# Patient Record
Sex: FEMALE | Race: ASIAN | Employment: FULL TIME | URBAN - METROPOLITAN AREA
[De-identification: names, ages, dates, MRNs, and addresses within clinical notes are randomized per-mention and may not be internally consistent; named-entity substitution may affect disease eponyms.]

---

## 2018-03-15 PROCEDURE — 88175 CYTOPATH C/V AUTO FLUID REDO: CPT | Performed by: FAMILY MEDICINE

## 2018-03-15 PROCEDURE — 87624 HPV HI-RISK TYP POOLED RSLT: CPT | Performed by: FAMILY MEDICINE

## 2018-03-19 PROCEDURE — 84480 ASSAY TRIIODOTHYRONINE (T3): CPT | Performed by: FAMILY MEDICINE

## 2025-05-30 ENCOUNTER — HOSPITAL ENCOUNTER (EMERGENCY)
Age: 55
Discharge: HOME | End: 2025-05-30
Attending: EMERGENCY MEDICINE
Payer: COMMERCIAL

## 2025-05-30 VITALS
HEART RATE: 83 BPM | OXYGEN SATURATION: 96 % | RESPIRATION RATE: 20 BRPM | TEMPERATURE: 99 F | HEIGHT: 65 IN | WEIGHT: 153 LBS | SYSTOLIC BLOOD PRESSURE: 130 MMHG | BODY MASS INDEX: 25.49 KG/M2 | DIASTOLIC BLOOD PRESSURE: 87 MMHG

## 2025-05-30 DIAGNOSIS — E87.6 HYPOKALEMIA: ICD-10-CM

## 2025-05-30 DIAGNOSIS — K29.00 ACUTE GASTRITIS WITHOUT HEMORRHAGE, UNSPECIFIED GASTRITIS TYPE: ICD-10-CM

## 2025-05-30 DIAGNOSIS — J11.1 INFLUENZA: Primary | ICD-10-CM

## 2025-05-30 LAB
ALBUMIN SERPL-MCNC: 4.9 G/DL (ref 3.2–4.8)
ALBUMIN/GLOB SERPL: 1.4 {RATIO} (ref 1–2)
ALP LIVER SERPL-CCNC: 103 U/L (ref 46–118)
ALT SERPL-CCNC: 32 U/L (ref 10–49)
ANION GAP SERPL CALC-SCNC: 8 MMOL/L (ref 0–18)
AST SERPL-CCNC: 26 U/L (ref ?–34)
BASOPHILS # BLD AUTO: 0.01 X10(3) UL (ref 0–0.2)
BASOPHILS NFR BLD AUTO: 0.2 %
BILIRUB SERPL-MCNC: 0.7 MG/DL (ref 0.3–1.2)
BILIRUB UR QL STRIP.AUTO: NEGATIVE
BUN BLD-MCNC: 9 MG/DL (ref 9–23)
CALCIUM BLD-MCNC: 9 MG/DL (ref 8.7–10.6)
CHLORIDE SERPL-SCNC: 98 MMOL/L (ref 98–112)
CLARITY UR REFRACT.AUTO: CLEAR
CO2 SERPL-SCNC: 27 MMOL/L (ref 21–32)
COLOR UR AUTO: YELLOW
CREAT BLD-MCNC: 0.78 MG/DL (ref 0.55–1.02)
EGFRCR SERPLBLD CKD-EPI 2021: 89 ML/MIN/1.73M2 (ref 60–?)
EOSINOPHIL # BLD AUTO: 0.01 X10(3) UL (ref 0–0.7)
EOSINOPHIL NFR BLD AUTO: 0.2 %
ERYTHROCYTE [DISTWIDTH] IN BLOOD BY AUTOMATED COUNT: 11.9 %
GLOBULIN PLAS-MCNC: 3.5 G/DL (ref 2–3.5)
GLUCOSE BLD-MCNC: 125 MG/DL (ref 70–99)
GLUCOSE UR STRIP.AUTO-MCNC: NEGATIVE MG/DL
HCT VFR BLD AUTO: 40.2 % (ref 35–48)
HGB BLD-MCNC: 14 G/DL (ref 12–16)
IMM GRANULOCYTES # BLD AUTO: 0.03 X10(3) UL (ref 0–1)
IMM GRANULOCYTES NFR BLD: 0.6 %
KETONES UR STRIP.AUTO-MCNC: NEGATIVE MG/DL
LEUKOCYTE ESTERASE UR QL STRIP.AUTO: NEGATIVE
LIPASE SERPL-CCNC: 50 U/L (ref 12–53)
LYMPHOCYTES # BLD AUTO: 1.27 X10(3) UL (ref 1–4)
LYMPHOCYTES NFR BLD AUTO: 23.3 %
MCH RBC QN AUTO: 31.4 PG (ref 26–34)
MCHC RBC AUTO-ENTMCNC: 34.8 G/DL (ref 31–37)
MCV RBC AUTO: 90.1 FL (ref 80–100)
MONOCYTES # BLD AUTO: 0.44 X10(3) UL (ref 0.1–1)
MONOCYTES NFR BLD AUTO: 8.1 %
NEUTROPHILS # BLD AUTO: 3.68 X10 (3) UL (ref 1.5–7.7)
NEUTROPHILS # BLD AUTO: 3.68 X10(3) UL (ref 1.5–7.7)
NEUTROPHILS NFR BLD AUTO: 67.6 %
NITRITE UR QL STRIP.AUTO: NEGATIVE
OSMOLALITY SERPL CALC.SUM OF ELEC: 276 MOSM/KG (ref 275–295)
PH UR STRIP.AUTO: 7 [PH] (ref 5–8)
PLATELET # BLD AUTO: 272 10(3)UL (ref 150–450)
POCT INFLUENZA A: POSITIVE
POCT INFLUENZA B: NEGATIVE
POTASSIUM SERPL-SCNC: 3.2 MMOL/L (ref 3.5–5.1)
PROT SERPL-MCNC: 8.4 G/DL (ref 5.7–8.2)
RBC # BLD AUTO: 4.46 X10(6)UL (ref 3.8–5.3)
RBC UR QL AUTO: NEGATIVE
SARS-COV-2 RNA RESP QL NAA+PROBE: NOT DETECTED
SODIUM SERPL-SCNC: 133 MMOL/L (ref 136–145)
SP GR UR STRIP.AUTO: 1.01 (ref 1–1.03)
UROBILINOGEN UR STRIP.AUTO-MCNC: 0.2 MG/DL
WBC # BLD AUTO: 5.4 X10(3) UL (ref 4–11)

## 2025-05-30 PROCEDURE — 81001 URINALYSIS AUTO W/SCOPE: CPT | Performed by: EMERGENCY MEDICINE

## 2025-05-30 PROCEDURE — 87502 INFLUENZA DNA AMP PROBE: CPT | Performed by: EMERGENCY MEDICINE

## 2025-05-30 PROCEDURE — 99284 EMERGENCY DEPT VISIT MOD MDM: CPT

## 2025-05-30 PROCEDURE — 96375 TX/PRO/DX INJ NEW DRUG ADDON: CPT

## 2025-05-30 PROCEDURE — 80053 COMPREHEN METABOLIC PANEL: CPT | Performed by: EMERGENCY MEDICINE

## 2025-05-30 PROCEDURE — 87430 STREP A AG IA: CPT | Performed by: EMERGENCY MEDICINE

## 2025-05-30 PROCEDURE — 96374 THER/PROPH/DIAG INJ IV PUSH: CPT

## 2025-05-30 PROCEDURE — 83690 ASSAY OF LIPASE: CPT | Performed by: EMERGENCY MEDICINE

## 2025-05-30 PROCEDURE — 96361 HYDRATE IV INFUSION ADD-ON: CPT

## 2025-05-30 PROCEDURE — 85025 COMPLETE CBC W/AUTO DIFF WBC: CPT | Performed by: EMERGENCY MEDICINE

## 2025-05-30 PROCEDURE — 81015 MICROSCOPIC EXAM OF URINE: CPT | Performed by: EMERGENCY MEDICINE

## 2025-05-30 RX ORDER — ONDANSETRON 4 MG/1
4 TABLET, ORALLY DISINTEGRATING ORAL EVERY 4 HOURS PRN
Qty: 10 TABLET | Refills: 0 | Status: SHIPPED | OUTPATIENT
Start: 2025-05-30 | End: 2025-06-06

## 2025-05-30 RX ORDER — KETOROLAC TROMETHAMINE 15 MG/ML
15 INJECTION, SOLUTION INTRAMUSCULAR; INTRAVENOUS ONCE
Status: COMPLETED | OUTPATIENT
Start: 2025-05-30 | End: 2025-05-30

## 2025-05-30 RX ORDER — POTASSIUM CHLORIDE 1500 MG/1
40 TABLET, EXTENDED RELEASE ORAL ONCE
Status: COMPLETED | OUTPATIENT
Start: 2025-05-30 | End: 2025-05-30

## 2025-05-30 RX ORDER — ONDANSETRON 2 MG/ML
4 INJECTION INTRAMUSCULAR; INTRAVENOUS ONCE
Status: COMPLETED | OUTPATIENT
Start: 2025-05-30 | End: 2025-05-30

## 2025-05-30 RX ORDER — PANTOPRAZOLE SODIUM 40 MG/1
40 TABLET, DELAYED RELEASE ORAL DAILY
Qty: 30 TABLET | Refills: 0 | Status: SHIPPED | OUTPATIENT
Start: 2025-05-30 | End: 2025-06-29

## 2025-05-30 NOTE — ED PROVIDER NOTES
Patient Seen in: ward Emergency Department In [place]        History  Chief Complaint   Patient presents with    Abdomen/Flank Pain    Sore Throat     Stated Complaint: sore throat and abdomen flank pain after returning from a cruise . hx pan*    Subjective:   HPI            55-year-old female comes to the hospital complaint having difficulty with a sore throat over the last few days as well as some epigastric pain that is worse at night over the last few days.  She just came back on  from cruise to [place].  She has had nausea and occasional cough but denies any vomiting or diarrhea.  She is a urinary symptoms.  She has any headaches.  She denies any diarrhea.  She has no other specific complaints at this time.      Objective:     Past Medical History:    ACL (anterior cruciate ligament) tear    Hypothyroid              Past Surgical History:   Procedure Laterality Date    Cholecystectomy            x2    Other surgical history Left     acl reconstruction      Other surgical history Left 2019    left knee                Social History     Socioeconomic History    Marital status:    Tobacco Use    Smoking status: Never    Smokeless tobacco: Never    Tobacco comments:     Job:  owns [employer]   Vaping Use    Vaping status: Never Used   Substance and Sexual Activity    Alcohol use: No    Drug use: Never                                Physical Exam    ED Triage Vitals [25 0645]   BP (!) 160/94   Pulse 90   Resp 20   Temp (!) 102.7 °F (39.3 °C)   Temp src Oral   SpO2 100 %   O2 Device None (Room air)       Current Vitals:   Vital Signs  BP: 130/87  Pulse: 83  Resp: 20  Temp: 99.4 °F (37.4 °C)  Temp src: Oral    Oxygen Therapy  SpO2: 96 %  O2 Device: None (Room air)            Physical Exam  HEENT : NCAT, EOMI, PEERL,  neck supple, no JVD, trachea midline, No LAD  Heart: S1S2 normal. No murmurs, regular rate and rhythm  Lungs: Clear to auscultation bilaterally  Abdomen: Soft  nontender nondistended normal active bowel sounds without rebound, guarding or masses noted  Back nontender without CVA tenderness  Extremity no clubbing, cyanosis or edema noted.  Full range of motion noted without tenderness  Neuro: No focal deficits noted    All measures to prevent infection transmission during my interaction with the patient were taken.  The patient was already wearing droplet mask on my arrival to the room.  Personal protective equipment including a droplet mask as well as gloves were worn throughout the duration of my exam.  Hand washing was performed prior to and after the exam.  Stethoscope and equipment used during my examination was cleaned with a super Sani cloth germicidal wipe following the exam.        ED Course  Labs Reviewed   COMP METABOLIC PANEL (14) - Abnormal; Notable for the following components:       Result Value    Glucose 125 (*)     Sodium 133 (*)     Potassium 3.2 (*)     Total Protein 8.4 (*)     Albumin 4.9 (*)     All other components within normal limits   URINALYSIS, ROUTINE - Abnormal; Notable for the following components:    Protein Urine 30 mg/dL (*)     All other components within normal limits   UA MICROSCOPIC ONLY, URINE - Abnormal; Notable for the following components:    Bacteria Urine Rare (*)     Squamous Epi. Cells Few (*)     All other components within normal limits   POCT FLU TEST - Abnormal; Notable for the following components:    POCT INFLUENZA A Positive (*)     All other components within normal limits    Narrative:     This assay is a rapid molecular in vitro test utilizing nucleic acid amplification of influenza A and B viral RNA.   LIPASE - Normal   RAPID STREP A SCREEN (LC) - Normal    Narrative:     A confirmatory culture is recommended if clinically indicated.   RAPID SARS-COV-2 BY PCR - Normal   CBC WITH DIFFERENTIAL WITH PLATELET   SCAN SLIDE       ED Course as of 05/30/25  0809  ------------------------------------------------------------  Time: 05/30 0807  Comment: While here the patient's urinalysis was negative.  She is positive for influenza A.  Her COVID test was negative.  CBC was normal.  The sodium was 133 the potassium was 3.2.  The potassium was replaced.  Her lipase was 50.  She is given IV fluid as well as Toradol and Zofran with improvement.     Medications   ondansetron (Zofran) 4 MG/2ML injection 4 mg (4 mg Intravenous Given 5/30/25 0658)   ketorolac (Toradol) 15 MG/ML injection 15 mg (15 mg Intravenous Given 5/30/25 0658)   sodium chloride 0.9 % IV bolus 1,000 mL (0 mL Intravenous Stopped 5/30/25 2217)   potassium chloride (Klor-Con M20) tab 40 mEq (40 mEq Oral Given 5/30/25 0743)                       MDM     Differential diagnosis included COVID, influenza, gastritis, ulcer disease, gastroenteritis, pancreatitis but not limited to such.  Patient symptoms are consistent with influenza and gastritis.  She is improved with the above we discharged home with medical management follow-up.    Patient was screened and evaluated during this visit.   As a treating physician attending to the patient, I determined, within reasonable clinical confidence and prior to discharge, that an emergency medical condition was not or was no longer present.  There was no indication for further evaluation, treatment or admission on an emergency basis.       The usual and customary discharge instuctions were discussed given the patient's ER course.  We discussed signs and symptoms that should prompt the patient's immediate return to the emergency department.   Reasonable over the counter and prescription treatment options and Physician follow up plan was discussed.       The patient is discharged in good condition.     This note was prepared using Dragon Medical voice recognition dictation software.  As a result errors may occur.  When identified to these areas have been corrected.  While  every attempt is made to correct errors during dictation discrepancies may still exist.  Please contact if there are any errors.        Medical Decision Making      Disposition and Plan     Clinical Impression:  1. Influenza    2. Acute gastritis without hemorrhage, unspecified gastritis type    3. Hypokalemia         Disposition:  Discharge  5/30/2025  8:08 am    Follow-up:  Jonatan Ko DO  [address] [facility] [address]  [phone]    Schedule an appointment as soon as possible for a visit in 3 day(s)            Medications Prescribed:  Current Discharge Medication List        START taking these medications    Details   ondansetron 4 MG Oral Tablet Dispersible Take 1 tablet (4 mg total) by mouth every 4 (four) hours as needed for Nausea.  Qty: 10 tablet, Refills: 0      pantoprazole 40 MG Oral Tab EC Take 1 tablet (40 mg total) by mouth daily.  Qty: 30 tablet, Refills: 0                   Supplementary Documentation:

## 2025-06-01 ENCOUNTER — HOSPITAL ENCOUNTER (EMERGENCY)
Age: 55
Discharge: HOME | End: 2025-06-01
Attending: EMERGENCY MEDICINE
Payer: COMMERCIAL

## 2025-06-01 ENCOUNTER — APPOINTMENT (OUTPATIENT)
Dept: CT IMAGING | Age: 55
End: 2025-06-01
Attending: EMERGENCY MEDICINE
Payer: COMMERCIAL

## 2025-06-01 ENCOUNTER — APPOINTMENT (OUTPATIENT)
Dept: GENERAL RADIOLOGY | Age: 55
End: 2025-06-01
Attending: EMERGENCY MEDICINE
Payer: COMMERCIAL

## 2025-06-01 VITALS
HEIGHT: 66 IN | SYSTOLIC BLOOD PRESSURE: 155 MMHG | TEMPERATURE: 98 F | OXYGEN SATURATION: 96 % | RESPIRATION RATE: 18 BRPM | BODY MASS INDEX: 23.3 KG/M2 | DIASTOLIC BLOOD PRESSURE: 88 MMHG | HEART RATE: 74 BPM | WEIGHT: 145 LBS

## 2025-06-01 DIAGNOSIS — K21.9 GASTROESOPHAGEAL REFLUX DISEASE, UNSPECIFIED WHETHER ESOPHAGITIS PRESENT: ICD-10-CM

## 2025-06-01 DIAGNOSIS — J18.9 COMMUNITY ACQUIRED PNEUMONIA, UNSPECIFIED LATERALITY: Primary | ICD-10-CM

## 2025-06-01 LAB
ALBUMIN SERPL-MCNC: 4.4 G/DL (ref 3.2–4.8)
ALBUMIN/GLOB SERPL: 1.3 {RATIO} (ref 1–2)
ALP LIVER SERPL-CCNC: 102 U/L (ref 46–118)
ALT SERPL-CCNC: 36 U/L (ref 10–49)
ANION GAP SERPL CALC-SCNC: 8 MMOL/L (ref 0–18)
AST SERPL-CCNC: 35 U/L (ref ?–34)
BASOPHILS # BLD AUTO: 0.01 X10(3) UL (ref 0–0.2)
BASOPHILS NFR BLD AUTO: 0.1 %
BILIRUB SERPL-MCNC: 0.7 MG/DL (ref 0.3–1.2)
BUN BLD-MCNC: 7 MG/DL (ref 9–23)
CALCIUM BLD-MCNC: 8.6 MG/DL (ref 8.7–10.6)
CHLORIDE SERPL-SCNC: 103 MMOL/L (ref 98–112)
CO2 SERPL-SCNC: 26 MMOL/L (ref 21–32)
CREAT BLD-MCNC: 0.64 MG/DL (ref 0.55–1.02)
D DIMER PPP FEU-MCNC: 0.58 UG/ML FEU (ref ?–0.57)
EGFRCR SERPLBLD CKD-EPI 2021: 103 ML/MIN/1.73M2 (ref 60–?)
EOSINOPHIL # BLD AUTO: 0.05 X10(3) UL (ref 0–0.7)
EOSINOPHIL NFR BLD AUTO: 0.7 %
ERYTHROCYTE [DISTWIDTH] IN BLOOD BY AUTOMATED COUNT: 11.9 %
GLOBULIN PLAS-MCNC: 3.5 G/DL (ref 2–3.5)
GLUCOSE BLD-MCNC: 118 MG/DL (ref 70–99)
HCT VFR BLD AUTO: 38.5 % (ref 35–48)
HGB BLD-MCNC: 13.2 G/DL (ref 12–16)
IMM GRANULOCYTES # BLD AUTO: 0.01 X10(3) UL (ref 0–1)
IMM GRANULOCYTES NFR BLD: 0.1 %
LIPASE SERPL-CCNC: 62 U/L (ref 12–53)
LYMPHOCYTES # BLD AUTO: 1.78 X10(3) UL (ref 1–4)
LYMPHOCYTES NFR BLD AUTO: 23.3 %
MCH RBC QN AUTO: 31 PG (ref 26–34)
MCHC RBC AUTO-ENTMCNC: 34.3 G/DL (ref 31–37)
MCV RBC AUTO: 90.4 FL (ref 80–100)
MONOCYTES # BLD AUTO: 0.33 X10(3) UL (ref 0.1–1)
MONOCYTES NFR BLD AUTO: 4.3 %
NEUTROPHILS # BLD AUTO: 5.46 X10 (3) UL (ref 1.5–7.7)
NEUTROPHILS # BLD AUTO: 5.46 X10(3) UL (ref 1.5–7.7)
NEUTROPHILS NFR BLD AUTO: 71.5 %
OSMOLALITY SERPL CALC.SUM OF ELEC: 283 MOSM/KG (ref 275–295)
PLATELET # BLD AUTO: 251 10(3)UL (ref 150–450)
POTASSIUM SERPL-SCNC: 3.5 MMOL/L (ref 3.5–5.1)
PROT SERPL-MCNC: 7.9 G/DL (ref 5.7–8.2)
Q-T INTERVAL: 418 MS
QRS DURATION: 70 MS
QTC CALCULATION (BEZET): 454 MS
R AXIS: -4 DEGREES
RBC # BLD AUTO: 4.26 X10(6)UL (ref 3.8–5.3)
SODIUM SERPL-SCNC: 137 MMOL/L (ref 136–145)
T AXIS: 25 DEGREES
TROPONIN I SERPL HS-MCNC: 3 NG/L (ref ?–34)
VENTRICULAR RATE: 71 BPM
WBC # BLD AUTO: 7.6 X10(3) UL (ref 4–11)

## 2025-06-01 PROCEDURE — 84484 ASSAY OF TROPONIN QUANT: CPT | Performed by: EMERGENCY MEDICINE

## 2025-06-01 PROCEDURE — 83690 ASSAY OF LIPASE: CPT | Performed by: EMERGENCY MEDICINE

## 2025-06-01 PROCEDURE — 96375 TX/PRO/DX INJ NEW DRUG ADDON: CPT

## 2025-06-01 PROCEDURE — 87040 BLOOD CULTURE FOR BACTERIA: CPT | Performed by: EMERGENCY MEDICINE

## 2025-06-01 PROCEDURE — 71045 X-RAY EXAM CHEST 1 VIEW: CPT | Performed by: EMERGENCY MEDICINE

## 2025-06-01 PROCEDURE — 85379 FIBRIN DEGRADATION QUANT: CPT | Performed by: EMERGENCY MEDICINE

## 2025-06-01 PROCEDURE — 93005 ELECTROCARDIOGRAM TRACING: CPT

## 2025-06-01 PROCEDURE — 96365 THER/PROPH/DIAG IV INF INIT: CPT

## 2025-06-01 PROCEDURE — 85025 COMPLETE CBC W/AUTO DIFF WBC: CPT | Performed by: EMERGENCY MEDICINE

## 2025-06-01 PROCEDURE — 93010 ELECTROCARDIOGRAM REPORT: CPT

## 2025-06-01 PROCEDURE — 71275 CT ANGIOGRAPHY CHEST: CPT | Performed by: EMERGENCY MEDICINE

## 2025-06-01 PROCEDURE — 80053 COMPREHEN METABOLIC PANEL: CPT | Performed by: EMERGENCY MEDICINE

## 2025-06-01 PROCEDURE — 36415 COLL VENOUS BLD VENIPUNCTURE: CPT

## 2025-06-01 PROCEDURE — 99285 EMERGENCY DEPT VISIT HI MDM: CPT

## 2025-06-01 PROCEDURE — 96361 HYDRATE IV INFUSION ADD-ON: CPT

## 2025-06-01 RX ORDER — MAGNESIUM HYDROXIDE/ALUMINUM HYDROXICE/SIMETHICONE 120; 1200; 1200 MG/30ML; MG/30ML; MG/30ML
30 SUSPENSION ORAL ONCE
Status: COMPLETED | OUTPATIENT
Start: 2025-06-01 | End: 2025-06-01

## 2025-06-01 RX ORDER — BENZONATATE 100 MG/1
100 CAPSULE ORAL 3 TIMES DAILY PRN
Qty: 30 CAPSULE | Refills: 0 | Status: SHIPPED | OUTPATIENT
Start: 2025-06-01 | End: 2025-07-01

## 2025-06-01 RX ORDER — FAMOTIDINE 10 MG/ML
20 INJECTION, SOLUTION INTRAVENOUS ONCE
Status: COMPLETED | OUTPATIENT
Start: 2025-06-01 | End: 2025-06-01

## 2025-06-01 RX ORDER — AZITHROMYCIN 250 MG/1
TABLET, FILM COATED ORAL
Qty: 6 TABLET | Refills: 0 | Status: SHIPPED | OUTPATIENT
Start: 2025-06-01 | End: 2025-06-06

## 2025-06-01 RX ORDER — OMEPRAZOLE 40 MG/1
40 CAPSULE, DELAYED RELEASE ORAL DAILY
Qty: 30 CAPSULE | Refills: 0 | Status: SHIPPED | OUTPATIENT
Start: 2025-06-01 | End: 2025-07-01

## 2025-06-01 RX ORDER — KETOROLAC TROMETHAMINE 15 MG/ML
15 INJECTION, SOLUTION INTRAMUSCULAR; INTRAVENOUS ONCE
Status: COMPLETED | OUTPATIENT
Start: 2025-06-01 | End: 2025-06-01

## 2025-06-01 RX ORDER — HYDROCODONE BITARTRATE AND HOMATROPINE METHYLBROMIDE ORAL SOLUTION 5; 1.5 MG/5ML; MG/5ML
5 LIQUID ORAL NIGHTLY
Qty: 120 ML | Refills: 0 | Status: SHIPPED | OUTPATIENT
Start: 2025-06-01

## 2025-06-01 RX ORDER — ONDANSETRON 2 MG/ML
4 INJECTION INTRAMUSCULAR; INTRAVENOUS ONCE
Status: COMPLETED | OUTPATIENT
Start: 2025-06-01 | End: 2025-06-01

## 2025-06-01 NOTE — ED QUICK NOTES
Brought patient to the bathroom. Once she was finished, I brought her back to the room and made sure she was comfortable.

## 2025-06-01 NOTE — ED INITIAL ASSESSMENT (HPI)
Complaining of sorethroat since yesterday states difficult to take a deep breath,  . Seen in ED few days ago , + flu  A  with gastritis . + non stop cough at night .

## 2025-06-01 NOTE — ED PROVIDER NOTES
Patient Seen in: ward Emergency Department In [place]        History  Chief Complaint   Patient presents with    Shortness Of Breath     Stated Complaint: shortness of breath    Subjective:   HPI  Patient states she speaks English, she also speaks Chinese.   at bedside is present and gives history as well, occasionally translates to patient in Chinese.  Offered , they both declined.  55 yr old F pmh hyperlipidemia, cholecystectomy, GERD previously on omeprazole presents to ED with report of recent visit Friday for similar symptoms including cough, sore throat, epigastric discomfort, stuffy nose causing shortness of breath at which time she was diagnosed with flu a and gastritis.  She states symptoms started last Sunday, a week ago after a cruise.   states that other family members had similar symptoms.  They deny fevers, chills, vomiting, diarrhea, urinary symptoms, chest pain, leg edema.  She states that she has been staying up all night due to her cough.  She states that she tried NyQuil but did not take it last night.  She denies history of gastritis.  Per record she had a history of GERD in 2022 and was placed on omeprazole but states she has not been taking it.  Denies history of seeing GI.   states that their daughter is a PT and given her epigastric discomfort and nausea wanted her to come to the ER get her heart checked.      Objective:     No pertinent past medical history.            No pertinent past surgical history.              No pertinent social history.                              Physical Exam    ED Triage Vitals   BP 06/01/25 0622 (!) 152/96   Pulse 06/01/25 0622 72   Resp 06/01/25 0622 18   Temp 06/01/25 0622 98.1 °F (36.7 °C)   Temp src 06/01/25 0622 Oral   SpO2 06/01/25 0622 97 %   O2 Device 06/01/25 0832 None (Room air)       Current Vitals:   Vital Signs  BP: 155/88  Pulse: 74  Resp: 18  Temp: 98.1 °F (36.7 °C)  Temp src: Oral    Oxygen Therapy  SpO2: 96  %  O2 Device: None (Room air)            Physical Exam  Vital signs reviewed.  Nursing note reviewed.  Constitutional: Alert, no significant distress  Head: Normocephalic, atraumatic  Mouth: Moist  Eyes: Extraocular muscles intact, pupils equal  Cardiovascular: Regular rate and rhythm  Pulmonary: Effort normal, breath sounds normal, 97% room air, speaks in full sentences, no wheezing no crackles  Abdomen: Soft, mild epigastric tenderness, nondistended  Skin: Warm and dry  Musculoskeletal range of motion grossly normal all extremities  Neuro: Alert, at baseline, no focal neuro deficit.  Moves all extremities against gravity  Psych: Mood normal              ED Course  Labs Reviewed   COMP METABOLIC PANEL (14) - Abnormal; Notable for the following components:       Result Value    Glucose 118 (*)     BUN 7 (*)     Calcium, Total 8.6 (*)     AST 35 (*)     All other components within normal limits   LIPASE - Abnormal; Notable for the following components:    Lipase 62 (*)     All other components within normal limits   D-DIMER - Abnormal; Notable for the following components:    D-Dimer 0.58 (*)     All other components within normal limits   TROPONIN I HIGH SENSITIVITY - Normal   CBC WITH DIFFERENTIAL WITH PLATELET   SCAN SLIDE   BLOOD CULTURE   BLOOD CULTURE     EKG    Rate, intervals and axes as noted on EKG Report.  Rate: 71  Rhythm: Sinus Rhythm  Reading: No new contiguous ST-T wave abnormality                  CTA CHEST (CPT=71275)  Result Date: 6/1/2025  PROCEDURE:  CT ANGIOGRAPHY, CHEST (CPT=71275)  COMPARISON:  [facility], XR, XR CHEST AP PORTABLE  (CPT=71045), 6/01/2025, 7:13 AM.  INDICATIONS:  shortness of breath, elev d dimer  TECHNIQUE:  IV contrast-enhanced multislice CT angiography is performed through the pulmonary arterial anatomy. 3D volume renderings are generated.  Dose reduction techniques were used. Dose information is transmitted to the ACR (American College of Radiology) NRDR (National Radiology  Data Registry) which includes the Dose Index Registry.  PATIENT STATED HISTORY:(As transcribed by Technologist)  The patient states she has had shortness of breath and productive cough x 1 week.   CONTRAST USED:  100cc of Isovue 370  FINDINGS:  VASCULATURE:  No visible pulmonary arterial thrombus or attenuation.  THORACIC AORTA:  No aneurysm or visible dissection.  LUNGS:  Patchy airspace consolidation noted in the posterior lateral right upper lobe, superior segment of the right lower lobe, and posterior basilar segment and medial segment of the right lower lobe.  There is small patchy areas of consolidation within the left upper lobe posteriorly and laterally as well as the posterior medial basilar segment of the left lower lobe.  Findings most likely related to atypical pneumonia.  There is also bandlike opacity within the right middle lobe and lingula likely representing atelectasis. MEDIASTINUM:  Low right paratracheal lymph node measures 10 x 9 mm and high right paratracheal lymph node measures 9 x 9 mm. MONICA:  Right hilar adenopathy measuring 15 x 14 mm and infrahilar adenopathy measuring 18 x 10 mm.  Prominent left infrahilar lymph node measures 13 x 12 mm. CARDIAC:  No enlargement, pericardial thickening, or significant coronary artery calcification. PLEURA:  No mass or effusion.  CHEST WALL:  No mass or axillary adenopathy.  LIMITED ABDOMEN:  Limited images of the upper abdomen are unremarkable.  BONES:  No bony lesion or fracture.  OTHER:  Negative.             CONCLUSION:   1. Multifocal small areas of patchy airspace consolidation within the bilateral lungs concerning for infectious or inflammatory process such as atypical pneumonia.  2. Mild hilar lymphadenopathy likely due to the infectious or inflammatory process.  3. No evidence of pulmonary embolus.    LOCATION:  [facility]   Dictated by (CST): Miguel Winn MD on 6/01/2025 at 8:20 AM     Finalized by (CST): Miguel Winn MD on 6/01/2025 at 8:25 AM        XR CHEST AP PORTABLE  (CPT=71045)  Result Date: 6/1/2025  PROCEDURE:  XR CHEST AP PORTABLE  (CPT=71045)  TECHNIQUE:  AP chest radiograph was obtained.  COMPARISON:  None.  INDICATIONS:  shortness of breath  PATIENT STATED HISTORY: (As transcribed by Technologist)  Patient states, \"it feels harder to breath\" and she points to her upper chest and neck area.    FINDINGS:  The heart is normal in size.  The lungs are clear.  There are no pleural effusions.  The bones are unremarkable.            CONCLUSION:  No acute disease.    LOCATION:  [facility]      Dictated by (CST): Miguel Winn MD on 6/01/2025 at 7:36 AM     Finalized by (CST): Miguel Winn MD on 6/01/2025 at 7:36 AM                         MDM     Medical Decision Making:    Differential diagnosis before testing includes pneumonia, influenza related symptoms, sinusitis, myocarditis, PE, potential life threatening diagnosis which is a medical condition that poses a threat to life/function.     Comorbidities that add complexity to management: See HPI    I reviewed prior external ED notes including reviewed office visit from 2022 patient diagnosed with GERD and started on omeprazole      Social determinants of health care:  at bedside    I personally reviewed the radiographs and my independent interpretation includes no lung consolidations, no pneumothorax.     Shared decision making: Just    Afebrile, no leukocytosis, chest x-ray negative.  Troponin negative, EKG nonischemic.  D-dimer slightly elevated, CTA chest no PE, multifocal small areas of patchy airspace opacity likely pneumonia.  Given IV Rocephin, blood cultures drawn.  Patient up walking around, feels weak but wants to go home.  Offered admission to  and patient, however they declined and would like to DC home with antibiotics.  Given prescription for Augmentin, Z-[place], Tessalon Perles and Hycodan cough syrup.  Discussed with her history of GERD and was started on omeprazole 3 years ago,  but patient stopped taking it, given prescription for omeprazole 40 mg daily.  Lipase very minimally elevated, however not 3 times upper limit of normal so not clinically significant.  Upper abdomen viewed on CT no acute process.  History of cholecystectomy.  Likely gastritis/GERD, given Pepcid, Maalox in ED.  Feeling better now.  Discussed with her that she needs to call GI for follow-up and have an EGD scope done given her age and ongoing GERD symptoms.  Patient will return for worsening, or is follow-up with PCP within 2 days to ensure she is feeling better.  Also discussed to call GI and have close follow-up.  Initially possible junctional rhythm on EKG and she was reporting palpitations, likely related to pneumonia, discussed with her to call cardiology for follow-up in a week if palpitations symptomatology does not improve with treatment of pneumonia. Spent at least 35 min at bedside with pt and , gathering info and giving results, plan.     Medical Decision Making      Disposition and Plan     Clinical Impression:  1. Community acquired pneumonia, unspecified laterality    2. Gastroesophageal reflux disease, unspecified whether esophagitis present         Disposition:  Discharge  6/1/2025  9:09 am    Follow-up:  Fred Maki MD  [address] [place] [address]  [facility] [id number] [phone]    Follow up in 1 day(s)  establish doctor    Bryan Barreto MD  [address] [facility] [address] [phone]    Follow up in 1 day(s)      [facility]  [address]  [facility] [id number] [phone]  Follow up in 1 week(s)      [facility]  [address]  [address]  [facility] [address]  [phone]              Medications Prescribed:  Discharge Medication List as of 6/1/2025 10:14 AM        START taking these medications    Details   amoxicillin clavulanate 875-125 MG Oral Tab Take 1 tablet by mouth 2 (two) times daily for 10 days., Normal, Disp-20 tablet, R-0       azithromycin (ZITHROMAX Z-VERONICA) 250 MG Oral Tab 500 mg once followed by 250 mg daily x 4 days, Normal, Disp-6 tablet, R-0      benzonatate 100 MG Oral Cap Take 1 capsule (100 mg total) by mouth 3 (three) times daily as needed for cough., Normal, Disp-30 capsule, R-0      HYDROcodone-homatropine (HYCODAN) 5-1.5 MG/5ML Oral Solution Take 5 mL by mouth at bedtime., Normal, Disp-120 mL, R-0      Omeprazole 40 MG Oral Capsule Delayed Release Take 1 capsule (40 mg total) by mouth daily., Normal, Disp-30 capsule, R-0                   Supplementary Documentation:

## 2025-06-03 ENCOUNTER — OFFICE VISIT (OUTPATIENT)
Dept: FAMILY MEDICINE CLINIC | Facility: CLINIC | Age: 55
End: 2025-06-03
Payer: COMMERCIAL

## 2025-06-03 VITALS
RESPIRATION RATE: 18 BRPM | WEIGHT: 144 LBS | TEMPERATURE: 97 F | DIASTOLIC BLOOD PRESSURE: 74 MMHG | HEART RATE: 84 BPM | HEIGHT: 66 IN | BODY MASS INDEX: 23.14 KG/M2 | SYSTOLIC BLOOD PRESSURE: 120 MMHG

## 2025-06-03 DIAGNOSIS — J18.9 PNEUMONIA OF BOTH LOWER LOBES DUE TO INFECTIOUS ORGANISM: Primary | ICD-10-CM

## 2025-06-03 PROCEDURE — 3008F BODY MASS INDEX DOCD: CPT | Performed by: FAMILY MEDICINE

## 2025-06-03 PROCEDURE — 3078F DIAST BP <80 MM HG: CPT | Performed by: FAMILY MEDICINE

## 2025-06-03 PROCEDURE — 3074F SYST BP LT 130 MM HG: CPT | Performed by: FAMILY MEDICINE

## 2025-06-03 PROCEDURE — 99203 OFFICE O/P NEW LOW 30 MIN: CPT | Performed by: FAMILY MEDICINE

## 2025-06-03 NOTE — PROGRESS NOTES
[facility] Family Medicine Office Note  Chief Complaint:   Chief Complaint   Patient presents with    ER F/U       HPI:   This is a 55 year old female coming in for follow-up after being in the emergency room.  The patient was diagnosed with pneumonia.  She states that she has had improvement with the medications that she was given.  States that her shortness of breath has been improving she is still having some episodes of cough.  She denies any other acute concerns today.      Past Medical History[1]  Past Surgical History[2]  Social History:  Short Social Hx on File[3]  Family History:  Family History[4]  Allergies:  Allergies[5]  Current Meds:  Current Medications[6]   Counseling given: Not Answered  Tobacco comments: Job:  owns restaurant       REVIEW OF SYSTEMS:   Consitutional: No fevers, chills, sweats  Eye: No recent visual problems  ENMT: No ear pain nasal congestion sore throat  Respiratory: No shortness of breath, cough  Cardiovascular: No chest pain palpitations syncope  Gastrointestinal: No nausea vomiting diarrhea  Genitourinary: No hematuria  Hema/Lymph no bruising tendency, swollen lymph glands      Medical, surgical, family, and social histories were reviewed      EXAM:     VITALS:   Vitals:    06/03/25 1359   BP: 120/74   Pulse: 84   Resp: 18   Temp: 97.1 °F (36.2 °C)      GENERAL: well developed, well nourished, in no apparent distress  SKIN: no rashes, no suspicious lesions: Cool and Dry  HEENT: atraumatic, normocephalic, ears and throat are clear    Ears: TM's clear and visible bilaterally, no excess cerumen or erythema.   EYES: Pupils equal round and reactive.  Extraocular motions intact no scleral icterus no injection or drainage  THROAT without erythema tonsillar hypertrophy or exudate.  Uvula midline airway patent  NECK: Given midline.  No JVD or lymphadenopathy supple nontender no meningeal signs   LUNGS: clear to auscultation sounds equal bilaterally no wheezes rales or  rhonchi  CARDIO: Regular rate and rhythm without murmurs gallops or rubs    ASSESSMENT AND PLAN:   1. Pneumonia of both lower lobes due to infectious organism  I did discuss with the patient to continue with the medications that she was given in the emergency room.  She was asked to watch for any episodes of shortness of breath or increased coughing if this were to occur she was asked to be evaluated in person.    Meds & Refills for this Visit:  Requested Prescriptions      No prescriptions requested or ordered in this encounter       Health Maintenance:  Health Maintenance Due   Topic Date Due    Annual Physical  Never done    Pneumococcal Vaccine: 50+ Years (1 of 1 - PCV) Never done    Mammogram  10/08/2019    Pap Smear  03/15/2023    Zoster Vaccines (2 of 2) 2024    COVID-19 Vaccine (3 - - season) 2024    Annual Depression Screening  2025       Patient/Caregiver Education: Patient/Caregiver Education: There are no barriers to learning. Medical education done.   Outcome: Patient verbalizes understanding. Patient is notified to call with any questions, complications, allergies, or worsening or changing symptoms.  Patient is to call with any side effects or complications from the treatments as a result of today.     Problem List:  Problem List[7]      No follow-ups on file.     Fred Maki MD    Please note that portions of this note may have been completed with a voice recognition program. Efforts were made to edit the dictations but occasionally words are mis-transcribed.       [1]   Past Medical History:   ACL (anterior cruciate ligament) tear    Hypothyroid   [2]   Past Surgical History:  Procedure Laterality Date    Cholecystectomy            x2    Other surgical history Left     acl reconstruction      Other surgical history Left 2019    left knee   [3]   Social History  Socioeconomic History    Marital status:    Tobacco Use    Smoking status: Never    Smokeless  tobacco: Never    Tobacco comments:     Job:  owns restaurant   Vaping Use    Vaping status: Never Used   Substance and Sexual Activity    Alcohol use: No    Drug use: Never   [4]   Family History  Problem Relation Age of Onset    Other (Other) Father     Hypertension Mother     Cancer Neg     Heart Disorder Neg    [5] No Known Allergies  [6]   Current Outpatient Medications   Medication Sig Dispense Refill    amoxicillin clavulanate 875-125 MG Oral Tab Take 1 tablet by mouth 2 (two) times daily for 10 days. 20 tablet 0    Omeprazole 40 MG Oral Capsule Delayed Release Take 1 capsule (40 mg total) by mouth daily. 30 capsule 0    ondansetron 4 MG Oral Tablet Dispersible Take 1 tablet (4 mg total) by mouth every 4 (four) hours as needed for Nausea. 10 tablet 0    azithromycin (ZITHROMAX Z-VERONICA) 250 MG Oral Tab 500 mg once followed by 250 mg daily x 4 days 6 tablet 0    benzonatate 100 MG Oral Cap Take 1 capsule (100 mg total) by mouth 3 (three) times daily as needed for cough. (Patient not taking: Reported on 6/3/2025) 30 capsule 0    HYDROcodone-homatropine (HYCODAN) 5-1.5 MG/5ML Oral Solution Take 5 mL by mouth at bedtime. (Patient not taking: Reported on 6/3/2025) 120 mL 0    pantoprazole 40 MG Oral Tab EC Take 1 tablet (40 mg total) by mouth daily. (Patient not taking: Reported on 6/3/2025) 30 tablet 0   [7]   Patient Active Problem List  Diagnosis    Sprain of cruciate ligament of knee    Tear of lateral cartilage or meniscus of knee, current